# Patient Record
Sex: FEMALE | Race: WHITE | Employment: OTHER | URBAN - METROPOLITAN AREA
[De-identification: names, ages, dates, MRNs, and addresses within clinical notes are randomized per-mention and may not be internally consistent; named-entity substitution may affect disease eponyms.]

---

## 2017-11-13 DIAGNOSIS — D33.3 VESTIBULAR SCHWANNOMA (H): Primary | ICD-10-CM

## 2017-11-14 ENCOUNTER — OFFICE VISIT (OUTPATIENT)
Dept: AUDIOLOGY | Facility: CLINIC | Age: 67
End: 2017-11-14

## 2017-11-14 ENCOUNTER — OFFICE VISIT (OUTPATIENT)
Dept: OTOLARYNGOLOGY | Facility: CLINIC | Age: 67
End: 2017-11-14

## 2017-11-14 ENCOUNTER — HOSPITAL ENCOUNTER (OUTPATIENT)
Dept: MRI IMAGING | Facility: CLINIC | Age: 67
Discharge: HOME | End: 2017-11-14
Attending: OTOLARYNGOLOGY | Admitting: OTOLARYNGOLOGY
Payer: MEDICARE

## 2017-11-14 VITALS — WEIGHT: 155 LBS | HEIGHT: 65 IN | BODY MASS INDEX: 25.83 KG/M2

## 2017-11-14 DIAGNOSIS — L30.8 OTHER ECZEMA: Primary | ICD-10-CM

## 2017-11-14 DIAGNOSIS — D33.3 VESTIBULAR SCHWANNOMA (H): Primary | ICD-10-CM

## 2017-11-14 DIAGNOSIS — D33.3 ACOUSTIC NEUROMA (H): ICD-10-CM

## 2017-11-14 DIAGNOSIS — H90.3 SENSORINEURAL HEARING LOSS (SNHL) OF BOTH EARS: Primary | ICD-10-CM

## 2017-11-14 PROCEDURE — A9585 GADOBUTROL INJECTION: HCPCS | Performed by: OTOLARYNGOLOGY

## 2017-11-14 PROCEDURE — 25000128 H RX IP 250 OP 636: Performed by: OTOLARYNGOLOGY

## 2017-11-14 PROCEDURE — 70553 MRI BRAIN STEM W/O & W/DYE: CPT

## 2017-11-14 RX ORDER — TRIAMCINOLONE ACETONIDE 0.25 MG/G
CREAM TOPICAL 2 TIMES DAILY
Qty: 453.6 G | Refills: 0 | Status: SHIPPED | OUTPATIENT
Start: 2017-11-14

## 2017-11-14 RX ORDER — MUPIROCIN 20 MG/G
OINTMENT TOPICAL
Qty: 22 G | Refills: 0 | Status: SHIPPED | OUTPATIENT
Start: 2017-11-14 | End: 2017-11-24

## 2017-11-14 RX ORDER — GADOBUTROL 604.72 MG/ML
7.5 INJECTION INTRAVENOUS ONCE
Status: COMPLETED | OUTPATIENT
Start: 2017-11-14 | End: 2017-11-14

## 2017-11-14 RX ADMIN — GADOBUTROL 7.5 ML: 604.72 INJECTION INTRAVENOUS at 12:39

## 2017-11-14 ASSESSMENT — PAIN SCALES - GENERAL: PAINLEVEL: NO PAIN (0)

## 2017-11-14 NOTE — NURSING NOTE
Chief Complaint   Patient presents with     RECHECK     follow up after MRI and audio     Kem Marie LPN

## 2017-11-14 NOTE — PROGRESS NOTES
AUDIOLOGY REPORT    SUMMARY: Audiology visit completed. See audiogram for results.      RECOMMENDATIONS: Follow-up with ENT.    Mirza Desir.  Licensed Audiologist  [place] #[id number]

## 2017-11-14 NOTE — LETTER
11/14/2017       RE: Patricia Iglesias  [address] [address] [address]     Dear Colleague,    Thank you for referring your patient, Patricia Iglesias, to the [facility] EAR NOSE AND THROAT at [facility]. Please see a copy of my visit note below.    We had the pleasure of seeing Patricia Iglesias today in followup in the lateral Skull Base Surgery Clinic at the [facility].      HISTORY OF PRESENT ILLNESS:  Ms. Iglesias is a 67-year-old woman who had a right translabyrinthine approach for removal of acoustic neuroma on 11/17/2015.  She presents for a two-year followup today.      She has been doing very well since we last saw her.  She continues to have excellent facial nerve function.  She has had no change in her balance and overall is feeling well from a Skull Base Surgery standpoint.  She is interested in pursuing a CROS or BAHA and learned a bit more about that since last time and finds it more desirable now.       She does report new symptoms.  She has developed significant pruritus in the external auditory canal, worse on the right than the left, and with this, she finds that she may be scratching it quite a bit at night and  this has led to a frustrating itch-scratch cycle and some crusting over the right meatus.      PHYSICAL EXAMINATION:  She appeared well.  She was in good spirits.  She had full facial nerve activation.  Her postauricular incision is well-healed.  On the right, there is eczema of the superior meatus and conchal bowl and there is some honey colored crust overlying this suggesting superficial staph infection, but the skin does not appear deeply infected and there is no distinct cellulitis.  The ear canal itself but once I get past the meatus, is lined with very healthy skin.  There is a normal intact tympanic membrane with aerated middle ear space.  On the left, there is less severe eczema of the external auditory canal or meatus.      AUDIOGRAM:   Her audiogram demonstrates the left ear which is not the operated ear with a stable mild sensorineural loss with 25-dB speech reception threshold, 96% word recognition score.  We did not test the right ear as she is known to have a profound loss.  She has type A tympanograms bilaterally, and on the left, has an intact ipsilateral and contralateral acoustic reflex.      IMAGING:  I reviewed her MRI scan.  This shows the expected postoperative changes, fat graft was in place within the mastoid cavity.  There is no evidence of residual or recurrent disease.      IMPRESSION AND PLAN:  Ms. Iglesias is a 67-year old woman who has no evidence of disease, now two years status post translabyrinthine removal of right vestibular schwannoma and presents today with new right eczematous otitis externa and mild left eczematous otitis externa.      For her tumor, we recommend one year follow up with an MRI scan and audiogram.  I recommended followup strategy when there is no evidence of disease after trans labs to typically get the scans every year for three years, then at five years, and finally at 10 years.        I prescribed Bactroban ointment the right ear and then once she has completed the week long course of this, she can change the triamcinolone cream to decrease the eczema.  She can use this on a nightly basis.         Again, thank you for allowing me to participate in the care of your patient.      Sincerely,    Henny Abdalla MD         cc: Veronica Goldstein MD    [address] [place]  [address]       Preston Flores MD    [address]    [address]    [place] [address]      MA/ms

## 2017-11-14 NOTE — LETTER
2017       RE: Patricia Ley  [address] [facility] [address]     Dear Colleague,    Thank you for referring your patient, Patricia Ley, to the [facility] EAR NOSE AND THROAT at [facility]. Please see a copy of my visit note below.    HISTORY OF PRESENT ILLNESS:  Ms. Ley is seen in [facility] along with my colleague, Henny Abdalla, for a second-year followup after translabyrinthine excision of her vestibular schwannoma.      She is feeling well and doing well.  Major issues discussed today had to do with hearing restoration with either a CROS or a hearing aid or a Baha device.  Her facial nerve function is normal.      IMAGING:  We reviewed the images, and there is no evidence of residual or recurrent tumor.  There is some enhancement from the fat graft.      ASSESSMENT:  Ms. Ley is doing extremely well postoperatively and has no evidence of residual or recurrent tumor.      RECOMMENDATIONS:  We have recommended a repeat scan and audiogram in 1 year.         PRINCESS NETTLES MD            D: 2017 17:05   T: 11/15/2017 08:36   MT: DEBORAH      Name:     PATRICIA LEY   MRN:      [id number]        Account:      [id number]   :      1950           Service Date: 2017      Document: [id number]

## 2017-11-14 NOTE — MR AVS SNAPSHOT
After Visit Summary   11/14/2017    Patricia Iglesias    MRN: [id number]           Patient Information     Date Of Birth          1950        Visit Information        Provider Department      11/14/2017 3:15 PM Henny Abdalla MD [facility] Ear Nose and Throat        Today's Diagnoses     Eczema    -  1      Care Instructions    1. Please follow-up in clinic in 1 year with repeat MRI and audio  2. Please call the ENT clinic with any questions,concerns, new or worsening symptoms.    -Clinic number is [phone]   - Susannah's direct line (Dr. Abdalla' and Dr. Herrmann nurse) [phone]    3. Please  your prescription at the pharmacy and use as directed.           Follow-ups after your visit        Who to contact     Please call your clinic at [phone] to:    Ask questions about your health    Make or cancel appointments    Discuss your medicines    Learn about your test results    Speak to your doctor   If you have compliments or concerns about an experience at your clinic, or if you wish to file a complaint, please contact [facility] Physicians Patient Relations at [phone] or email us at [email or web address]         Additional Information About Your Visit        MyChart Information     [employer] gives you secure access to your electronic health record. If you see a primary care provider, you can also send messages to your care team and make appointments. If you have questions, please call your primary care clinic.  If you do not have a primary care provider, please call [phone] and they will assist you.      SchoolOut is an electronic gateway that provides easy, online access to your medical records. With SchoolOut, you can request a clinic appointment, read your test results, renew a prescription or communicate with your care team.     To access your existing account, please contact your [facility] Clinic or call [phone] for  "assistance.        Care EveryWhere ID     This is your Care EveryWhere ID. This could be used by other organizations to access your [place] medical records  [id number]        Your Vitals Were     Height BMI (Body Mass Index)                1.66 m (5' 5.35\") 25.51 kg/m2           Blood Pressure from Last 3 Encounters:   11/19/15 147/75   11/13/15 128/79    Weight from Last 3 Encounters:   11/14/17 70.3 kg (155 lb)   10/25/16 71.7 kg (158 lb)   11/16/15 78.5 kg (173 lb 1 oz)              Today, you had the following     No orders found for display         Today's Medication Changes          These changes are accurate as of: 11/14/17  3:46 PM.  If you have any questions, ask your nurse or doctor.               Start taking these medicines.        Dose/Directions    mupirocin 2 % ointment   Commonly known as:  BACTROBAN   Used for:  Eczema   Started by:  Henny Abdalla MD        Apply a small amount to affected ear 2 times a day for 10 days   Quantity:  22 g   Refills:  0       triamcinolone 0.025 % cream   Commonly known as:  KENALOG   Used for:  Eczema   Started by:  Henny Abdalla MD        Apply topically 2 times daily   Quantity:  453.6 g   Refills:  0            Where to get your medicines      These medications were sent to [facility] [address] [place] [address]     Phone:  [phone]     mupirocin 2 % ointment    triamcinolone 0.025 % cream                Primary Care Provider Office Phone # Fax #    Veronica Goldstein [phone]       [place] [id number]        Equal Access to Services     EMPERATRIZ ORLANDO AH: Jimena Haley, wamoriahda luqadaha, qaybta kaalmada shad, natan mcgraw. So [facility] [phone].    ATENCIÓN: Si habla español, tiene a self disposición servicios gratuitos de asistencia lingüística. Llame al " [phone].    We comply with applicable federal civil rights laws and [place] laws. We do not discriminate on the basis of race, color, national origin, age, disability, sex, sexual orientation, or gender identity.            Thank you!     Thank you for choosing [facility] EAR NOSE AND THROAT  for your care. Our goal is always to provide you with excellent care. Hearing back from our patients is one way we can continue to improve our services. Please take a few minutes to complete the written survey that you may receive in the mail after your visit with us. Thank you!             Your Updated Medication List - Protect others around you: Learn how to safely use, store and throw away your medicines at www.disposemymeds.org.          This list is accurate as of: 11/14/17  3:46 PM.  Always use your most recent med list.                   Brand Name Dispense Instructions for use Diagnosis    acetaminophen 650 MG 8 hour tablet     100 tablet    Take 650 mg by mouth every 6 hours as needed for mild pain    Vestibular schwannoma (H)       ASPIRIN PO      Take 325 mg by mouth    Acoustic neuroma (H)       CALCIUM 600 + D PO      Take 1 tablet by mouth 2 times daily        lisinopril 20 MG tablet    PRINIVIL/ZESTRIL     Take 20 mg by mouth daily        MULTI FOR HER PO           mupirocin 2 % ointment    BACTROBAN    22 g    Apply a small amount to affected ear 2 times a day for 10 days    Eczema       PREMARIN PO      Take 0.3 mg by mouth four times a week        simvastatin 20 MG tablet    ZOCOR     Take by mouth daily        triamcinolone 0.025 % cream    KENALOG    453.6 g    Apply topically 2 times daily    Eczema

## 2017-11-14 NOTE — PATIENT INSTRUCTIONS
1. Please follow-up in clinic in 1 year with repeat MRI and audio  2. Please call the ENT clinic with any questions,concerns, new or worsening symptoms.    -Clinic number is [phone]   - [facility]'s direct line (Dr. Abdalla' and Dr. Herrmann nurse) [phone]    3. Please  your prescription at the pharmacy and use as directed.

## 2017-11-14 NOTE — MR AVS SNAPSHOT
After Visit Summary   11/14/2017    Patricia Iglesias    MRN: [id number]           Patient Information     Date Of Birth          1950        Visit Information        Provider Department      11/14/2017 11:00 AM Betsey Barber AuD M [facility] Audiology        Today's Diagnoses     Sensorineural hearing loss (SNHL) of both ears    -  1       Follow-ups after your visit        Your next 10 appointments already scheduled     Nov 14, 2017 12:00 PM CST   MR BRAIN W/O & W CONTRAST with UUMR2   [facility] [place] ([facility], [facility])    [address] [facility] [id number] [phone]           Take your medicines as usual, unless your doctor tells you not to. Bring a list of your current medicines to your exam (including vitamins, minerals and over-the-counter drugs).  You will be given intravenous contrast for this exam. To prepare:   The day before your exam, drink extra fluids at least six 8-ounce glasses (unless your doctor tells you to restrict your fluids).   Have a blood test (creatinine test) within 30 days of your exam. Go to your clinic or Diagnostic Imaging Department for this test.  The MRI machine uses a strong magnet. Please wear clothes without metal (snaps, zippers). A sweatsuit works well, or we may give you a hospital gown.  Please remove any body piercings and hair extensions before you arrive. You will also remove watches, jewelry, hairpins, wallets, dentures, partial dental plates and hearing aids. You may wear contact lenses, and you may be able to wear your rings. We have a safe place to keep your personal items, but it is safer to leave them at home.   **IMPORTANT** THE INSTRUCTIONS BELOW ARE ONLY FOR THOSE PATIENTS WHO HAVE BEEN TOLD THEY WILL RECEIVE SEDATION OR GENERAL ANESTHESIA DURING THEIR MRI PROCEDURE:  IF YOU WILL RECEIVE SEDATION (take medicine to help you relax during your exam):   You must get the medicine from  your doctor before you arrive. Bring the medicine to the exam. Do not take it at home.   Arrive one hour early. Bring someone who can take you home after the test. Your medicine will make you sleepy. After the exam, you may not drive, take a bus or take a taxi by yourself.   No eating 8 hours before your exam. You may have clear liquids up until 4 hours before your exam. (Clear liquids include water, clear tea, black coffee and fruit juice without pulp.)  IF YOU WILL RECEIVE ANESTHESIA (be asleep for your exam):   Arrive 1 1/2 hours early. Bring someone who can take you home after the test. You may not drive, take a bus or take a taxi by yourself.   No eating 8 hours before your exam. You may have clear liquids up until 4 hours before your exam. (Clear liquids include water, clear tea, black coffee and fruit juice without pulp.)  Please call the Imaging Department at your exam site with any questions.            Nov 14, 2017  3:15 PM CST   (Arrive by 3:00 PM)   RETURN CRANIOFACIAL SKULL BASE with Henny Abdalla MD   [facility] Ear Nose and Throat ([facility])    [address] [place] [id number] [phone]            Nov 14, 2017  3:15 PM CST   (Arrive by 3:00 PM)   RETURN CRANIOFACIAL SKULL BASE with Rene Herrmann MD   [facility] Ear Nose and Throat ([facility])    [address] [place] [address] [phone]              Who to contact     Please call your clinic at [phone] to:    Ask questions about your health    Make or cancel appointments    Discuss your medicines    Learn about your test results    Speak to your doctor   If you have compliments or concerns about an experience at your clinic, or if you wish to file a complaint, please contact [facility] Patient Relations at [phone] or email us at [email or web address]         Additional  Information About Your Visit        &TV Communicationshart Information     Elasticsearch gives you secure access to your electronic health record. If you see a primary care provider, you can also send messages to your care team and make appointments. If you have questions, please call your primary care clinic.  If you do not have a primary care provider, please call [phone] and they will assist you.      Elasticsearch is an electronic gateway that provides easy, online access to your medical records. With Elasticsearch, you can request a clinic appointment, read your test results, renew a prescription or communicate with your care team.     To access your existing account, please contact your [facility] or call [phone] for assistance.        Care EveryWhere ID     This is your Care EveryWhere ID. This could be used by other organizations to access your [place] medical records  [id number]         Blood Pressure from Last 3 Encounters:   11/19/15 147/75   11/13/15 128/79    Weight from Last 3 Encounters:   10/25/16 71.7 kg (158 lb)   11/16/15 78.5 kg (173 lb 1 oz)   11/13/15 78.5 kg (173 lb)              We Performed the Following     AUDIOGRAM/TYMPANOGRAM - INTERFACE     Reduced 52 - Cmpr[facility] Audiometry Thrshld Eval & Speech Recog   ([address])     Tymps / Reflex   ([address])        Primary Care Provider Office Phone # Fax #    Veronica Goldstein [phone]       [place] [address]        Equal Access to Services     AMANDA ORLANDO AH: Hadii aad ku hadasho Soomaali, waaxda luqadaha, qaybta kaalmada adeegyada, waxmarkel cameron haygogon jameson khararoddy la'arpita . So [facility] [phone].    ATENCIÓN: Si habla español, tiene a self disposición servicios gratuitos de asistencia lingüística. Llame al [phone].    We comply with applicable federal civil rights laws and [place] laws. We do not discriminate on the basis of race, color, national origin, age, disability, sex, sexual  orientation, or gender identity.            Thank you!     Thank you for choosing [facility] AUDIOLOGY  for your care. Our goal is always to provide you with excellent care. Hearing back from our patients is one way we can continue to improve our services. Please take a few minutes to complete the written survey that you may receive in the mail after your visit with us. Thank you!             Your Updated Medication List - Protect others around you: Learn how to safely use, store and throw away your medicines at www.disposemymeds.org.          This list is accurate as of: 11/14/17 11:01 AM.  Always use your most recent med list.                   Brand Name Dispense Instructions for use Diagnosis    acetaminophen 650 MG 8 hour tablet     100 tablet    Take 650 mg by mouth every 6 hours as needed for mild pain    Vestibular schwannoma (H)       ASPIRIN PO      Take 325 mg by mouth    Acoustic neuroma (H)       CALCIUM 600 + D PO      Take 1 tablet by mouth 2 times daily        lisinopril 20 MG tablet    PRINIVIL/ZESTRIL     Take 20 mg by mouth daily        MULTI FOR HER PO           PREMARIN PO      Take 0.3 mg by mouth four times a week        simvastatin 20 MG tablet    ZOCOR     Take by mouth daily

## 2017-11-15 NOTE — PROGRESS NOTES
HISTORY OF PRESENT ILLNESS:  Ms. Ley is seen in [facility] along with my colleague, Henny Abdalla, for a second-year followup after translabyrinthine excision of her vestibular schwannoma.      She is feeling well and doing well.  Major issues discussed today had to do with hearing restoration with either a CROS or a hearing aid or a Baha device.  Her facial nerve function is normal.      IMAGING:  We reviewed the images, and there is no evidence of residual or recurrent tumor.  There is some enhancement from the fat graft.      ASSESSMENT:  Ms. Ley is doing extremely well postoperatively and has no evidence of residual or recurrent tumor.      RECOMMENDATIONS:  We have recommended a repeat scan and audiogram in 1 year.         PRINCESS NETTLES MD             D: 2017 17:05   T: 11/15/2017 08:36   MT: DEBORAH      Name:     SWATI LEY   MRN:      [id number]        Account:      [id number]   :      1950           Service Date: 2017      Document: [id number]

## 2017-11-15 NOTE — PROGRESS NOTES
We had the pleasure of seeing Patricia Iglesias today in followup in the lateral Skull Base Surgery Clinic at the [facility].      HISTORY OF PRESENT ILLNESS:  Ms. Iglesias is a 67-year-old woman who had a right translabyrinthine approach for removal of acoustic neuroma on 11/17/2015.  She presents for a two-year followup today.      She has been doing very well since we last saw her.  She continues to have excellent facial nerve function.  She has had no change in her balance and overall is feeling well from a Skull Base Surgery standpoint.  She is interested in pursuing a CROS or BAHA and learned a bit more about that since last time and finds it more desirable now.       She does report new symptoms.  She has developed significant pruritus in the external auditory canal, worse on the right than the left, and with this, she finds that she may be scratching it quite a bit at night and  this has led to a frustrating itch-scratch cycle and some crusting over the right meatus.      PHYSICAL EXAMINATION:  She appeared well.  She was in good spirits.  She had full facial nerve activation.  Her postauricular incision is well-healed.  On the right, there is eczema of the superior meatus and conchal bowl and there is some honey colored crust overlying this suggesting superficial staph infection, but the skin does not appear deeply infected and there is no distinct cellulitis.  The ear canal itself but once I get past the meatus, is lined with very healthy skin.  There is a normal intact tympanic membrane with aerated middle ear space.  On the left, there is less severe eczema of the external auditory canal or meatus.      AUDIOGRAM:  Her audiogram demonstrates the left ear which is not the operated ear with a stable mild sensorineural loss with 25-dB speech reception threshold, 96% word recognition score.  We did not test the right ear as she is known to have a profound loss.  She has type A tympanograms bilaterally,  and on the left, has an intact ipsilateral and contralateral acoustic reflex.      IMAGING:  I reviewed her MRI scan.  This shows the expected postoperative changes, fat graft was in place within the mastoid cavity.  There is no evidence of residual or recurrent disease.      IMPRESSION AND PLAN:  Ms. Iglesias is a 67-year old woman who has no evidence of disease, now two years status post translabyrinthine removal of right vestibular schwannoma and presents today with new right eczematous otitis externa and mild left eczematous otitis externa.      For her tumor, we recommend one year follow up with an MRI scan and audiogram.  I recommended followup strategy when there is no evidence of disease after trans labs to typically get the scans every year for three years, then at five years, and finally at 10 years.        I prescribed Bactroban ointment the right ear and then once she has completed the week long course of this, she can change the triamcinolone cream to decrease the eczema.  She can use this on a nightly basis.      cc: Veronica Goldstein MD    [address]  [address]       Preston Flores MD    [address]    [address]    [place] [address]      MA/ms

## 2018-03-29 ENCOUNTER — MYC MEDICAL ADVICE (OUTPATIENT)
Dept: OTOLARYNGOLOGY | Facility: CLINIC | Age: 68
End: 2018-03-29

## 2018-03-29 DIAGNOSIS — D33.3 VESTIBULAR SCHWANNOMA (H): ICD-10-CM

## 2018-03-29 DIAGNOSIS — H90.3 SNHL (SENSORY-NEURAL HEARING LOSS), ASYMMETRICAL: Primary | ICD-10-CM

## 2018-10-22 ENCOUNTER — TELEPHONE (OUTPATIENT)
Dept: OTOLARYNGOLOGY | Facility: CLINIC | Age: 68
End: 2018-10-22

## 2018-10-22 NOTE — TELEPHONE ENCOUNTER
Left patient voicemail regarding rescheduling MRI, audio, Dr Abdalla, and Dr Herrmann. Gave patient my direct phone number to contact.    10-23-18  Patient left message for rescheduling appointments. Asked to be called on her cell phone.    10-23-18  Called patient on cell phone number provided and mailbox was full so unable to leave message. Called patient's home number and stated her cell phone was called but no message was left and that I would try again on her cell phone. I did give her the option to come on October 30, 3:00 audio, 4:00 Doctors. Gave patient my direct phone number to contact.       Nelda  ENT Senior Clinic Coordinator

## 2018-11-06 ENCOUNTER — HOSPITAL ENCOUNTER (OUTPATIENT)
Dept: MRI IMAGING | Facility: CLINIC | Age: 68
Discharge: HOME | End: 2018-11-06
Attending: OTOLARYNGOLOGY | Admitting: OTOLARYNGOLOGY
Payer: MEDICARE

## 2018-11-06 ENCOUNTER — OFFICE VISIT (OUTPATIENT)
Dept: AUDIOLOGY | Facility: CLINIC | Age: 68
End: 2018-11-06
Payer: COMMERCIAL

## 2018-11-06 DIAGNOSIS — H90.A22 SENSORINEURAL HEARING LOSS (SNHL) OF LEFT EAR WITH RESTRICTED HEARING OF RIGHT EAR: Primary | ICD-10-CM

## 2018-11-06 DIAGNOSIS — D33.3 ACOUSTIC NEUROMA (H): ICD-10-CM

## 2018-11-06 PROCEDURE — A9585 GADOBUTROL INJECTION: HCPCS | Performed by: RADIOLOGY

## 2018-11-06 PROCEDURE — 25500064 ZZH RX 255 OP 636: Performed by: RADIOLOGY

## 2018-11-06 PROCEDURE — 70553 MRI BRAIN STEM W/O & W/DYE: CPT

## 2018-11-06 RX ORDER — GADOBUTROL 604.72 MG/ML
0.1 INJECTION INTRAVENOUS ONCE
Status: COMPLETED | OUTPATIENT
Start: 2018-11-06 | End: 2018-11-06

## 2018-11-06 RX ADMIN — GADOBUTROL 7 ML: 604.72 INJECTION INTRAVENOUS at 13:36

## 2018-11-06 NOTE — PROGRESS NOTES
AUDIOLOGY REPORT    SUMMARY: Audiology visit completed. See audiogram for results.      RECOMMENDATIONS: Follow-up with ENT.      Sarahy Whiteside  Audiologist  [place] License  #[id number]

## 2018-11-06 NOTE — MR AVS SNAPSHOT
After Visit Summary   11/6/2018    Patricia Iglesias    MRN: [id number]           Patient Information     Date Of Birth          1950        Visit Information        Provider Department      11/6/2018 3:00 PM Silvia Horton AuD [facility]        Today's Diagnoses     Sensorineural hearing loss (SNHL) of left ear with restricted hearing of right ear    -  1       Follow-ups after your visit        Your next 10 appointments already scheduled     Nov 06, 2018  3:00 PM CST   Walk In From ENT with Sarahy Whiteside [facility] ([facility] [place])    [address]  [address] [facility] [address] [phone]              Who to contact     Please call your clinic at [phone] to:    Ask questions about your health    Make or cancel appointments    Discuss your medicines    Learn about your test results    Speak to your doctor            Additional Information About Your Visit        [employer] Information     [employer] gives you secure access to your electronic health record. If you see a primary care provider, you can also send messages to your care team and make appointments. If you have questions, please call your primary care clinic.  If you do not have a primary care provider, please call [phone] and they will assist you.      [employer] is an electronic gateway that provides easy, online access to your medical records. With [employer], you can request a clinic appointment, read your test results, renew a prescription or communicate with your care team.     To access your existing account, please contact your [facility] or call [phone] for assistance.        Care EveryWhere ID     This is your Care EveryWhere ID. This could be used by other organizations to access your [facility] medical records  [id number]         Blood Pressure from Last 3 Encounters:   11/19/15 147/75   11/13/15 128/79    Weight from  Last 3 Encounters:   11/14/17 70.3 kg (155 lb)   10/25/16 71.7 kg (158 lb)   11/16/15 78.5 kg (173 lb 1 oz)              We Performed the Following     AUDIOGRAM/TYMPANOGRAM - INTERFACE     Reduced 52 - C[facility]pr[facility] Audiometry Thrshld Eval & Speech Recog   ([address])     Tymps / Reflex   (18645)        Primary Care Provider Office Phone # Fax #    Veronica Goldstein [phone]       [address] [address]        Equal Access to Services     : Hadii aad ku hadasho Soomaali, waaxda luqadaha, qaybta kaalmada adeegyada, waxmarkel goodrichin hayaan adevalorie correa . So [facility] [phone].    ATENCIÓN: Si habla español, tiene a self disposición servicios gratuitos de asistencia lingüística. [facility] [phone].    We comply with applicable federal civil rights laws and [place] laws. We do not discriminate on the basis of race, color, national origin, age, disability, sex, sexual orientation, or gender identity.            Thank you!     Thank you for choosing [facility]  for your care. Our goal is always to provide you with excellent care. Hearing back from our patients is one way we can continue to improve our services. Please take a few minutes to complete the written survey that you may receive in the mail after your visit with us. Thank you!             Your Updated Medication List - Protect others around you: Learn how to safely use, store and throw away your medicines at www.disposemymeds.org.          This list is accurate as of 11/6/18  2:43 PM.  Always use your most recent med list.                   Brand Name Dispense Instructions for use Diagnosis    acetaminophen 650 MG 8 hour tablet     100 tablet    Take 650 mg by mouth every 6 hours as needed for mild pain    Vestibular schwannoma (H)       ASPIRIN PO      Take 325 mg by mouth    Acoustic neuroma (H)       CALCIUM 600 + D PO      Take 1 tablet by mouth 2 times daily        lisinopril 20 MG tablet     PRINIVIL/ZESTRIL     Take 20 mg by mouth daily        MULTI FOR HER PO           PREMARIN PO      Take 0.3 mg by mouth four times a week        simvastatin 20 MG tablet    ZOCOR     Take by mouth daily        triamcinolone 0.025 % cream    KENALOG    453.6 g    Apply topically 2 times daily    Other eczema

## 2018-11-14 ENCOUNTER — CARE COORDINATION (OUTPATIENT)
Dept: OTOLARYNGOLOGY | Facility: CLINIC | Age: 68
End: 2018-11-14

## 2018-11-14 NOTE — PROGRESS NOTES
Called patient with the following MRI results after Dr. Abdalla and Dr. Herrmann have reviewed the scan:    Impression:    1. No significant change since 11/14/2017. 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 No evidence of tumor progression.  2. Stable chronic left basal ganglia infarct.    Dr. Abdalla and Dr. Herrmann would like for patient to repeat MRI and audiogram in 2 years. Patient agreeable to plan and will call with further questions or concerns.     Susannah Borjas, RN, BSN

## 2020-03-02 ENCOUNTER — HEALTH MAINTENANCE LETTER (OUTPATIENT)
Age: 70
End: 2020-03-02

## 2020-12-20 ENCOUNTER — HEALTH MAINTENANCE LETTER (OUTPATIENT)
Age: 70
End: 2020-12-20

## 2021-04-18 ENCOUNTER — HEALTH MAINTENANCE LETTER (OUTPATIENT)
Age: 71
End: 2021-04-18

## 2021-10-03 ENCOUNTER — HEALTH MAINTENANCE LETTER (OUTPATIENT)
Age: 71
End: 2021-10-03

## 2022-03-20 ENCOUNTER — HEALTH MAINTENANCE LETTER (OUTPATIENT)
Age: 72
End: 2022-03-20

## 2022-05-15 ENCOUNTER — HEALTH MAINTENANCE LETTER (OUTPATIENT)
Age: 72
End: 2022-05-15